# Patient Record
Sex: FEMALE | Race: WHITE
[De-identification: names, ages, dates, MRNs, and addresses within clinical notes are randomized per-mention and may not be internally consistent; named-entity substitution may affect disease eponyms.]

---

## 2021-10-01 ENCOUNTER — HOSPITAL ENCOUNTER (EMERGENCY)
Dept: HOSPITAL 97 - ER | Age: 86
Discharge: HOME | End: 2021-10-01
Payer: COMMERCIAL

## 2021-10-01 VITALS — SYSTOLIC BLOOD PRESSURE: 168 MMHG | DIASTOLIC BLOOD PRESSURE: 88 MMHG

## 2021-10-01 VITALS — OXYGEN SATURATION: 100 % | TEMPERATURE: 98 F

## 2021-10-01 DIAGNOSIS — Z88.6: ICD-10-CM

## 2021-10-01 DIAGNOSIS — S32.511A: Primary | ICD-10-CM

## 2021-10-01 DIAGNOSIS — I10: ICD-10-CM
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
## 2021-10-01 PROCEDURE — 99283 EMERGENCY DEPT VISIT LOW MDM: CPT

## 2021-10-01 NOTE — RAD REPORT
EXAM DESCRIPTION:  RAD - Hip Right 2 View - 10/1/2021 11:13 am

 

CLINICAL HISTORY:  PAIN

 

COMPARISON:  No comparisons

 

FINDINGS:  No definite right hip fractures identified. The right superior pubic ramus is not well gregorio
luated. No dislocation. Moderate right acetabular joint degenerative changes.

 

IMPRESSION:  Possible right superior pubic ramus fracture. No other fractures are identified. CT coul
d confirm if clinically indicated.

## 2021-10-01 NOTE — ER
Nurse's Notes                                                                                     

 [facility] [place]                                                                 

Name: Collette Segovia                                                                              

Age: 86 yrs                                                                                       

Sex: Female                                                                                       

: 1935                                                                                   

MRN: [id number]                                                                                   

Arrival Date: 10/01/2021                                                                          

Time: 10:40                                                                                       

Account#: [id number]                                                                            

Bed 12                                                                                            

Private MD:                                                                                       

Diagnosis: Fracture of superior rim of right pubis, initial encounter for closed fracture         

                                                                                                  

Presentation:                                                                                     

10/01                                                                                             

10:49 Chief complaint: Patient states: R hip pain since Monday after catching herself from    ll1 

      falling. Pain is radiating into R leg now. Had a slip and fall on wet surface on the        

      way in to our ER today. Landed on L knee, but denies pain. Coronavirus screen: Vaccine      

      status: Patient reports receiving the 2nd dose of the covid vaccine. Client denies          

      travel out of the [place] in the last 14 days. At this time, the client does not indicate      

      any symptoms associated with coronavirus-19. Ebola Screen: Patient denies travel to an      

      Ebola-affected area in the 21 days before illness onset. Initial Sepsis Screen: Does        

      the patient meet any 2 criteria? No. Patient's initial sepsis screen is negative. Does      

      the patient have a suspected source of infection? Yes: Bone or joint infection. Risk        

      Assessment: Do you want to hurt yourself or someone else? Patient reports no desire to      

      harm self or others. Onset of symptoms was 2021.                              

10:49 Method Of Arrival: Ambulatory                                                           ll1 

10:49 Acuity: MARY 3                                                                           ll1 

                                                                                                  

Historical:                                                                                       

- Allergies:                                                                                      

10:51 Aspirin;                                                                                ll1 

- PMHx:                                                                                           

10:51 Hypertensive disorder;                                                                  ll1 

- PSHx:                                                                                           

10:51 hysterectomy;                                                                           ll1 

                                                                                                  

- Immunization history:: Client reports receiving the 2nd dose of the Covid vaccine.              

- Social history:: Smoking status: Patient denies any tobacco usage or history of.                

                                                                                                  

                                                                                                  

Screening:                                                                                        

10:58 Abuse screen: Denies threats or abuse. Nutritional screening: No deficits noted.        vg1 

      Tuberculosis screening: No symptoms or risk factors identified. Fall Risk No fall in        

      past 12 months (0 pts). No secondary diagnosis (0 pts). No IV (0 pts). Ambulatory Aid-      

      None/Bed Rest/Nurse Assist (0 pts). Gait- Normal/Bed Rest/Wheelchair (0 pts) Mental         

      Status- Oriented to own ability (0 pts). Total Ly Fall Scale indicates No Risk (0-24     

      pts).                                                                                       

                                                                                                  

Assessment:                                                                                       

10:55 General: Appears in no apparent distress. comfortable, Behavior is calm, cooperative.   vg1 

      Pain: Complains of pain in Right hip Pain currently is 9 out of 10 on a pain scale.         

      Neuro: Level of Consciousness is awake, alert, obeys commands, Oriented to person,          

      place, time, situation. Cardiovascular: Patient's skin is warm and dry. Respiratory:        

      Airway Respiratory effort is even, unlabored. GI: No signs and/or symptoms were             

      reported involving the gastrointestinal system. : No signs and/or symptoms were           

      reported regarding the genitourinary system. EENT: No signs and/or symptoms were            

      reported regarding the EENT system. Derm: Skin is intact, is healthy with good turgor.      

      Musculoskeletal: Circulation, motion, and sensation intact. Reports pain in Right hip       

      since 22. denies fall to right hip.                                                   

10:55 Reassessment: Pt stated took 600 mg of Ibuprofen this morning around 0900.              vg1 

11:45 Reassessment: Patient appears in no apparent distress at this time. No changes from     vg1 

      previously documented assessment. Patient and/or family updated on plan of care and         

      expected duration. Pain level reassessed. Patient is alert, oriented x 3, equal             

      unlabored respirations, skin warm/dry/pink.                                                 

                                                                                                  

Vital Signs:                                                                                      

10:49  / 109; Pulse 85; Resp 17; Temp 98.0; Pulse Ox 100% ; Height 5 ft. 5 in. (165.10  ll1 

      cm); Pain 9/10;                                                                             

11:45  / 88; Pulse 80; Resp 16; Pulse Ox 100% ;                                         vg1 

                                                                                                  

ED Course:                                                                                        

10:40 Patient arrived in ED.                                                                  as  

10:44 Quincy Alarcon PA is PHCP.                                                               [id number] 

10:44 Herman Plummer MD is Attending Physician.                                                [email or web address] 

10:44 Arm band placed on Patient placed in an exam room, on a stretcher.                      ll1 

10:51 Triage completed.                                                                       ll1 

10:53 Elise Melchor, RN is Primary Nurse.                                                  vg1 

10:58 Patient has correct armband on for positive identification. Bed in low position. Call   vg1 

      light in reach. Side rails up X 1.                                                          

10:58 No provider procedures requiring assistance completed. Patient did not have IV access   vg1 

      during this emergency room visit.                                                           

11:13 XRAY Hip RIGHT 2 view In Process Unspecified.                                           EDMS

11:32 Vikram Simmons MD is Referral Physician.                                                [id number] 

                                                                                                  

Administered Medications:                                                                         

No medications were administered                                                                  

                                                                                                  

                                                                                                  

Outcome:                                                                                          

11:32 Discharge ordered by MD.                                                                kyle 

11:45 Discharged to home ambulatory.                                                          vg1 

11:45 Condition: stable                                                                           

11:45 Discharge instructions given to patient, Instructed on discharge instructions, follow       

      up and referral plans. medication usage, Demonstrated understanding of instructions,        

      follow-up care, medications, Prescriptions given X 1.                                       

11:45 Patient left the ED.                                                                    vg1 

                                                                                                  

Signatures:                                                                                       

Dispatcher [employer]                           EDMS                                                 

Dara Reynoso Josh, PA PA   jr8                                                  

Elise Melchor, RN                    RN   vg1                                                  

Bonnie Beavers RN                       RN   ll1                                                  

                                                                                                  

Corrections: (The following items were deleted from the chart)                                    

10:51 10:51 Allergies: No Known Allergies; 1                                                ll1 

                                                                                                  

**************************************************************************************************

## 2024-09-11 ENCOUNTER — HOSPITAL ENCOUNTER (EMERGENCY)
Dept: HOSPITAL 97 - ER | Age: 89
Discharge: HOME | End: 2024-09-11
Payer: COMMERCIAL

## 2024-09-11 VITALS — OXYGEN SATURATION: 100 %

## 2024-09-11 VITALS — TEMPERATURE: 98.1 F | DIASTOLIC BLOOD PRESSURE: 84 MMHG | SYSTOLIC BLOOD PRESSURE: 181 MMHG

## 2024-09-11 DIAGNOSIS — L03.113: ICD-10-CM

## 2024-09-11 DIAGNOSIS — T63.481A: Primary | ICD-10-CM

## 2024-09-11 PROCEDURE — 99283 EMERGENCY DEPT VISIT LOW MDM: CPT

## 2024-09-11 PROCEDURE — 82947 ASSAY GLUCOSE BLOOD QUANT: CPT

## 2024-09-11 NOTE — EDPHYS
Physician Documentation                                                                           

 [facility]                                                                 

Name: Collette Segovia                                                                              

Age: 89 yrs                                                                                       

Sex: Female                                                                                       

: 1935                                                                                   

MRN: [id number]                                                                                   

Arrival Date: 2024                                                                          

Time: 19:58                                                                                       

Account#: [id number]                                                                            

Bed DX3                                                                                           

Private MD:                                                                                       

ED Physician Teo Cardoso                                                                    

HPI:                                                                                              

                                                                                             

20:07 This 89 yrs old  Female presents to ER via [facility] with complaints of       sp4 

      Insect Bite.                                                                                

                                                                                             

03:25 Very pleasant 89-year-old female presents with acute redness pain to the right arm and  sp4 

      hand after wasp bite.                                                                       

                                                                                                  

Historical:                                                                                       

- Allergies:                                                                                      

                                                                                             

20:31 No Known Allergies;                                                                     cm10

- PMHx:                                                                                           

20:31 Hypertensive disorder;                                                                  cm10

- PSHx:                                                                                           

20:31 hysterectomy;                                                                           cm10

                                                                                                  

- Immunization history:: Adult Immunizations up to date, Client reports receiving the             

  2nd dose of the Covid vaccine, Last tetanus immunization: unknown.                              

- Infectious Disease History:: Denies.                                                            

- Social history:: Smoking status: Patient denies any tobacco usage or history of.                

- Family history:: not pertinent.                                                                 

                                                                                                  

                                                                                                  

ROS:                                                                                              

                                                                                             

03:25 Constitutional: Negative for fever, chills, and weight loss, right hand redness         sp4 

      tenderness pain swelling                                                                    

      All other systems are negative,                                                             

                                                                                                  

Exam:                                                                                             

03:25 Constitutional:  This is a well developed, well nourished patient who is awake, alert,  sp4 

      and in no acute distress. Head/Face:  Normocephalic, atraumatic. Eyes:  Pupils equal        

      round and reactive to light, extra-ocular motions intact.  Lids and lashes normal.          

      Conjunctiva and sclera are not injected.  Cornea within normal limits.  Periorbital         

      areas with no swelling, redness, or edema. ENT:  Nares patent. No nasal discharge, no       

      septal abnormalities noted.  Tympanic membranes are normal and external auditory canals     

      are clear.  Oropharynx with no redness, swelling, or masses, exudates, or evidence of       

      obstruction, uvula midline.  Mucous membranes moist. Neck:  Trachea midline, no             

      thyromegaly or masses palpated, and no cervical lymphadenopathy.  Supple, full range of     

      motion without nuchal rigidity, or vertebral point tenderness.  Chest/axilla:  Normal       

      chest wall appearance and motion.  Nontender with no deformity.  No lesions are             

      appreciated. Cardiovascular:  Regular rate and rhythm with a normal S1 and S2.  No          

      gallops, murmurs, or rubs.  Normal PMI, no JVD.  No pulse deficits. Respiratory:  Lungs     

      have equal breath sounds bilaterally, clear to auscultation and percussion.  No rales,      

      rhonchi or wheezes noted.  No increased work of breathing, no retractions or nasal          

      flaring. Abdomen/GI:  Soft,  with normal bowel sounds.  No distension or tympany.  No       

      guarding or rebound.  No evidence of tenderness throughout. Back:  No spinal                

      tenderness.  No costovertebral tenderness.     Skin:  Warm, dry with normal turgor.         

      Right hand exam reveals right dorsal hand the redness tenderness swelling and heat          

      consistent with either cellulitis or acute allergic reaction. MS/ Extremity:  Pulses        

      equal, no cyanosis.  Neurovascular intact.  Full, normal range of motion. Neuro:  Awake     

      and alert, GCS 15, oriented to person, place, time, and situation.  Cranial nerves          

      II-XII grossly intact.  Motor strength 5/5 in all extremities.  Sensory grossly intact.     

       Psych:  Awake, alert, with orientation to person, place and time.  Behavior, mood, and     

      affect are within normal limits                                                             

                                                                                                  

Vital Signs:                                                                                      

                                                                                             

20:30  / 83; Pulse 67; Resp 16; Temp 97; Pulse Ox 100% ; Weight 49.44 kg (R); Height 5  cm10

      ft. 5 in. (R); Pain 7/10;                                                                   

21:27  / 84; Pulse 61; Resp 17 S; Temp 98.1(O); Pulse Ox 100% on R/A;                   lg3 

20:30 Body Mass Index 18.14 (49.44 kg, 165.1 cm)                                              cm10

20:30 Pain Scale: Adult                                                                       cm10

                                                                                                  

[place] Coma Score:                                                                               

                                                                                             

03:25 Eye Response: spontaneous(4). Motor Response: obeys commands(6). Verbal Response:       sp4 

      oriented(5). Total: 15.                                                                     

                                                                                                  

MDM:                                                                                              

                                                                                             

21:09 Patient medically screened.                                                             sp4 

                                                                                             

03:25 Differential Diagnosis altered mental status, sepsis, flu, Cellulitis . Data reviewed:  sp4 

      vital signs, nurses notes. ED course: Exam consistent with either allergic reaction or      

      cellulitis. Will treat as both .                                                            

                                                                                                  

                                                                                             

20:54 Order name: Glucose, Ancillary Testing; Complete Time: 21:10                            EDMS

                                                                                             

20:40 Order name: Accucheck Blood Glucose; Complete Time: 20:42                               sp4 

                                                                                                  

Administered Medications:                                                                         

                                                                                             

21:05 Drug: predniSONE PO 60 mg PO once Route: PO;                                            lg3 

21:31 Follow up: Response: No adverse reaction                                                lg3 

21:05 Drug: diphenhydrAMINE PO 25 mg PO once Route: PO;                                       lg3 

21:31 Follow up: Response: No adverse reaction                                                lg3 

21:05 Drug: Trimethoprim-Sulfamethoxazole PO (160 mg-800 mg (DS) 1 tablet PO once Route: PO;  lg3 

21:30 Follow up: Response: No adverse reaction                                                lg3 

                                                                                                  

                                                                                                  

Point of Care Testing:                                                                            

      Blood Glucose:                                                                              

20:42 Blood Glucose: 139 mg/dL;                                                               cm10

      Ranges:                                                                                     

      Critical Glucose Levels:Adult <50 mg/dl or >400 mg/dl  <40 mg/dl or >180 mg/dl       

Disposition:                                                                                      

                                                                                             

03:27 Chart complete.                                                                         sp4 

                                                                                                  

Disposition Summary:                                                                              

24 21:12                                                                                    

Discharge Ordered                                                                                 

 Notes:       Location: Home                                                                        
  sp4

      Problem: new                                                                            sp4 

      Symptoms: have improved                                                                 sp4 

      Condition: Stable                                                                       sp4 

      Diagnosis                                                                                   

        - Acute hymenoptera envenomation, right hand cellulitis                               sp4 

      Followup:                                                                               sp4 

        - With: Private Physician                                                                  

        - When: 10 - 14 days                                                                       

        - Reason: Recheck today's complaints                                                       

      Discharge Instructions:                                                                     

        - Discharge Summary Sheet                                                             sp4 

        - Insect Bite, Adult, Easy-to-Read                                                    sp4 

      Forms:                                                                                      

        - Patient Portal Instructions                                                         sp4 

      Prescriptions:                                                                              

        - Benadryl 25 mg Oral capsule                                                              

            - take 1 capsule ORAL route every 12 hours As needed PRN redness and itching; 30  sp4 

      tablet; Refills: 0, Product Selection Permitted                                             

        - Bactrim -160 mg Oral Tablet                                                        

            - take 1 tablet ORAL route every 12 hours for 10 days; 20 tablet; Refills: 0,     sp4 

      Product Selection Permitted                                                                 

        - Prednisone 20 mg Oral Tablet                                                             

            - take 2 tablets ORAL route once daily for 5 days; 10 tablet; Refills: 0, Product sp4 

      Selection Permitted                                                                         

Signatures:                                                                                       

Anglea Dos Santos RN                         RN   lg3                                                  

Teo Cardoso MD MD   sp4                                                  

Eliane Reynoso RN                  RN   cm10                                                 

                                                                                                  

Corrections: (The following items were deleted from the chart)                                    

                                                                                             

20:31 20:31 Allergies: Aspirin; cm10                                                          cm10

                                                                                                  

**************************************************************************************************

## 2024-09-11 NOTE — ER
Nurse's Notes                                                                                     

 [facility]                                                                 

Name: Collette Segovia                                                                              

Age: 89 yrs                                                                                       

Sex: Female                                                                                       

: 1935                                                                                   

MRN: [id number]                                                                                   

Arrival Date: 2024                                                                          

Time: 19:58                                                                                       

Account#: [id number]                                                                            

Bed DX3                                                                                           

Private MD:                                                                                       

Diagnosis: Acute hymenoptera envenomation, right hand cellulitis                                  

                                                                                                  

Presentation:                                                                                     

                                                                                             

20:30 Chief complaint: Patient states: Stung by yellow jackets this morning on right hand. PT cm10

      reports swelling and redness getting worse. No bendaryl taken. Coronavirus screen:          

      Client denies travel out of the [place] in the last 14 days. At this time, the client does     

      not indicate any symptoms associated with coronavirus-19. Ebola Screen: Patient denies      

      travel to an Ebola-affected area in the 21 days before illness onset. No symptoms or        

      risks identified at this time. Initial Sepsis Screen: Does the patient meet any 2           

      criteria? No. Patient's initial sepsis screen is negative. Does the patient have a          

      suspected source of infection? No. Patient's initial sepsis screen is negative. Risk        

      Assessment: Do you want to hurt yourself or someone else? Patient reports no desire to      

      harm self or others. Onset of symptoms was 2024.                              

20:30 Method Of Arrival: Ambulatory                                                           cm10

20:30 Acuity: MARY 4                                                                           cm10

                                                                                                  

Triage Assessment:                                                                                

20:31 Bite description: bite sustained to right hand by a wasp, animal information:           cm10

      vaccination(s) is not applicable. General: Appears in no apparent distress.                 

      comfortable, Behavior is calm, cooperative. Neuro: No deficits noted. Level of              

      Consciousness is awake, alert, obeys commands, Oriented to person, place, time,             

      situation, Appropriate for age. Respiratory: No deficits noted. Airway is patent            

      Respiratory effort is even, unlabored, Respiratory pattern is regular, symmetrical.         

                                                                                                  

Historical:                                                                                       

- Allergies:                                                                                      

20:31 No Known Allergies;                                                                     cm10

- PMHx:                                                                                           

20:31 Hypertensive disorder;                                                                  cm10

- PSHx:                                                                                           

20:31 hysterectomy;                                                                           cm10

                                                                                                  

- Immunization history:: Adult Immunizations up to date, Client reports receiving the             

  2nd dose of the Covid vaccine, Last tetanus immunization: unknown.                              

- Infectious Disease History:: Denies.                                                            

- Social history:: Smoking status: Patient denies any tobacco usage or history of.                

- Family history:: not pertinent.                                                                 

                                                                                                  

                                                                                                  

Screenin:27 [facility] ED Fall Risk Assessment (Adult) History of falling in the last 3 months,       lg3 

      including since admission No falls in past 3 months (0 pts) Confusion or Disorientation     

      No (0 pts) Intoxicated or Sedated No (0 pts) Impaired Gait No (0 pts) Mobility Assist       

      Device Used No (0 pt) Altered Elimination No (0 pt) Score/Fall Risk Level 0 - 2 = Low       

      Risk Oriented to surroundings, Maintained a safe environment, Educated pt \T\ family on     

      fall prevention, incl call for assistance when getting out of bed, Assessed \T\             

      reinforced patient's understanding of fall precautions. Abuse screen: Denies threats or     

      abuse. Denies injuries from another. Nutritional screening: No deficits noted.              

      Tuberculosis screening: No symptoms or risk factors identified.                             

                                                                                                  

Assessment:                                                                                       

21:27 General: Appears in no apparent distress. comfortable, Behavior is calm, cooperative.   lg3 

      Pain: Complains of pain in right hand Pain does not radiate. Pain currently is 3 out of     

      10 on a pain scale. Neuro: No deficits noted. Level of Consciousness is awake, alert,       

      obeys commands, Oriented to person, place, time, situation, Appropriate for age.            

      Cardiovascular: No deficits noted. Denies chest pain, shortness of breath, Capillary        

      refill < 3 seconds Clubbing of nail beds is absent JVD is absent Patient's skin is warm     

      and dry. Respiratory: No deficits noted. Airway is patent Respiratory effort is even,       

      unlabored, Respiratory pattern is regular, symmetrical. GI: No deficits noted. No signs     

      and/or symptoms were reported involving the gastrointestinal system. : No deficits        

      noted. No signs and/or symptoms were reported regarding the genitourinary system. EENT:     

      No deficits noted. No signs and/or symptoms were reported regarding the EENT system.        

      Derm: Skin is intact, is healthy with good turgor, Skin is dry, Skin is normal, Skin        

      temperature is warm. Musculoskeletal: Circulation, motion, and sensation intact. Range      

      of motion: intact in all extremities, Swelling present in right hand.                       

                                                                                                  

Vital Signs:                                                                                      

20:30  / 83; Pulse 67; Resp 16; Temp 97; Pulse Ox 100% ; Weight 49.44 kg (R); Height 5  cm10

      ft. 5 in. (R); Pain 7/10;                                                                   

21:27  / 84; Pulse 61; Resp 17 S; Temp 98.1(O); Pulse Ox 100% on R/A;                   lg3 

20:30 Body Mass Index 18.14 (49.44 kg, 165.1 cm)                                              cm10

20:30 Pain Scale: Adult                                                                       cm10

                                                                                                  

Holli Coma Score:                                                                               

                                                                                             

03:25 Eye Response: spontaneous(4). Motor Response: obeys commands(6). Verbal Response:       sp4 

      oriented(5). Total: 15.                                                                     

                                                                                                  

ED Course:                                                                                        

                                                                                             

20:03 Patient arrived in ED.                                                                  jj6 

20:07 Toe Cardoso MD is Attending Physician.                                           sp4 

20:31 Triage completed.                                                                       cm10

20:32 Arm band placed on Patient placed in waiting room.                                      cm10

21:27 Patient has correct armband on for positive identification.                             lg3 

21:27 No provider procedures requiring assistance completed. Patient did not have IV access   lg3 

      during this emergency room visit.                                                           

                                                                                                  

Administered Medications:                                                                         

21:05 Drug: predniSONE PO 60 mg PO once Route: PO;                                            lg3 

21:31 Follow up: Response: No adverse reaction                                                lg3 

21:05 Drug: diphenhydrAMINE PO 25 mg PO once Route: PO;                                       lg3 

21:31 Follow up: Response: No adverse reaction                                                lg3 

21:05 Drug: Trimethoprim-Sulfamethoxazole PO (160 mg-800 mg (DS) 1 tablet PO once Route: PO;  lg3 

21:30 Follow up: Response: No adverse reaction                                                lg3 

                                                                                                  

                                                                                                  

Medication:                                                                                       

21:27 VIS not applicable for this client.                                                     lg3 

                                                                                                  

Point of Care Testing:                                                                            

      Blood Glucose:                                                                              

20:42 Blood Glucose: 139 mg/dL;                                                               cm10

      Ranges:                                                                                     

                                                                                                  

Outcome:                                                                                          

21:12 Discharge ordered by MD.                                                                sp4 

21:27 Discharged to home ambulatory,                                                          lg3 

21:27 Condition: stable                                                                           

21:27 Discharge instructions given to patient, Instructed on discharge instructions, follow       

      up and referral plans. medication usage, Demonstrated understanding of instructions,        

      follow-up care, medications, Prescriptions given X 3,                                       

21:31 Patient left the ED.                                                                    lg3 

                                                                                                  

Signatures:                                                                                       

Angela Dos Santos RN                         RN   lg3                                                  

Tammy Garcia                           [id number]                                                  

Teo Cardoso MD MD   sp4                                                  

Eliane Reynoso RN                  RN   cm10                                                 

                                                                                                  

Corrections: (The following items were deleted from the chart)                                    

20:31 20:31 Allergies: Aspirin; cm10                                                          cm10

                                                                                                  

**************************************************************************************************